# Patient Record
Sex: FEMALE | Race: WHITE | NOT HISPANIC OR LATINO | Employment: PART TIME | URBAN - METROPOLITAN AREA
[De-identification: names, ages, dates, MRNs, and addresses within clinical notes are randomized per-mention and may not be internally consistent; named-entity substitution may affect disease eponyms.]

---

## 2022-11-01 ENCOUNTER — HOSPITAL ENCOUNTER (OUTPATIENT)
Facility: HOSPITAL | Age: 42
Setting detail: SURGERY ADMIT
End: 2022-11-01
Attending: SURGERY | Admitting: SURGERY

## 2022-11-01 ENCOUNTER — HOSPITAL ENCOUNTER (INPATIENT)
Facility: HOSPITAL | Age: 42
LOS: 2 days | Discharge: HOME | End: 2022-11-03
Attending: SURGERY | Admitting: SURGERY

## 2022-11-01 ENCOUNTER — DOCUMENTATION (OUTPATIENT)
Dept: SURGERY | Facility: HOSPITAL | Age: 42
End: 2022-11-01

## 2022-11-01 DIAGNOSIS — B99.9 INFECTION: ICD-10-CM

## 2022-11-01 PROBLEM — T85.79XA: Status: ACTIVE | Noted: 2022-11-01

## 2022-11-01 LAB
ANION GAP SERPL CALCULATED.3IONS-SCNC: 12.8 MMOL/L (ref 5–15)
BASOPHILS # BLD AUTO: 0.05 10*3/MM3 (ref 0–0.2)
BASOPHILS NFR BLD AUTO: 0.5 % (ref 0–1.5)
BUN SERPL-MCNC: 20 MG/DL (ref 6–20)
BUN/CREAT SERPL: 25.3 (ref 7–25)
CALCIUM SPEC-SCNC: 9.5 MG/DL (ref 8.6–10.5)
CHLORIDE SERPL-SCNC: 101 MMOL/L (ref 98–107)
CO2 SERPL-SCNC: 27.2 MMOL/L (ref 22–29)
CREAT SERPL-MCNC: 0.79 MG/DL (ref 0.57–1)
DEPRECATED RDW RBC AUTO: 38.4 FL (ref 37–54)
EGFRCR SERPLBLD CKD-EPI 2021: 95.3 ML/MIN/1.73
EOSINOPHIL # BLD AUTO: 0.15 10*3/MM3 (ref 0–0.4)
EOSINOPHIL NFR BLD AUTO: 1.6 % (ref 0.3–6.2)
ERYTHROCYTE [DISTWIDTH] IN BLOOD BY AUTOMATED COUNT: 12.3 % (ref 12.3–15.4)
GLUCOSE SERPL-MCNC: 103 MG/DL (ref 65–99)
HCT VFR BLD AUTO: 39.4 % (ref 34–46.6)
HGB BLD-MCNC: 13.7 G/DL (ref 12–15.9)
IMM GRANULOCYTES # BLD AUTO: 0.04 10*3/MM3 (ref 0–0.05)
IMM GRANULOCYTES NFR BLD AUTO: 0.4 % (ref 0–0.5)
LYMPHOCYTES # BLD AUTO: 1.88 10*3/MM3 (ref 0.7–3.1)
LYMPHOCYTES NFR BLD AUTO: 19.5 % (ref 19.6–45.3)
MCH RBC QN AUTO: 29.6 PG (ref 26.6–33)
MCHC RBC AUTO-ENTMCNC: 34.8 G/DL (ref 31.5–35.7)
MCV RBC AUTO: 85.1 FL (ref 79–97)
MONOCYTES # BLD AUTO: 0.52 10*3/MM3 (ref 0.1–0.9)
MONOCYTES NFR BLD AUTO: 5.4 % (ref 5–12)
NEUTROPHILS NFR BLD AUTO: 7.01 10*3/MM3 (ref 1.7–7)
NEUTROPHILS NFR BLD AUTO: 72.6 % (ref 42.7–76)
NRBC BLD AUTO-RTO: 0 /100 WBC (ref 0–0.2)
PLATELET # BLD AUTO: 285 10*3/MM3 (ref 140–450)
PMV BLD AUTO: 10.6 FL (ref 6–12)
POTASSIUM SERPL-SCNC: 4.1 MMOL/L (ref 3.5–5.2)
RBC # BLD AUTO: 4.63 10*6/MM3 (ref 3.77–5.28)
SODIUM SERPL-SCNC: 141 MMOL/L (ref 136–145)
WBC NRBC COR # BLD: 9.65 10*3/MM3 (ref 3.4–10.8)

## 2022-11-01 PROCEDURE — 25010000002 AMPICILLIN-SULBACTAM PER 1.5 G: Performed by: SURGERY

## 2022-11-01 PROCEDURE — 0HPT0NZ REMOVAL OF TISSUE EXPANDER FROM RIGHT BREAST, OPEN APPROACH: ICD-10-PCS | Performed by: SURGERY

## 2022-11-01 PROCEDURE — 85025 COMPLETE CBC W/AUTO DIFF WBC: CPT | Performed by: SURGERY

## 2022-11-01 PROCEDURE — 80048 BASIC METABOLIC PNL TOTAL CA: CPT | Performed by: SURGERY

## 2022-11-01 RX ORDER — PROGESTERONE 100 MG/1
100 CAPSULE ORAL NIGHTLY
Status: DISCONTINUED | OUTPATIENT
Start: 2022-11-01 | End: 2022-11-03 | Stop reason: HOSPADM

## 2022-11-01 RX ORDER — ESCITALOPRAM OXALATE 20 MG/1
20 TABLET ORAL DAILY
COMMUNITY

## 2022-11-01 RX ORDER — PROGESTERONE 100 MG/1
100 CAPSULE ORAL DAILY
COMMUNITY

## 2022-11-01 RX ORDER — ACETAMINOPHEN 500 MG
1000 TABLET ORAL EVERY 8 HOURS PRN
Status: DISCONTINUED | OUTPATIENT
Start: 2022-11-01 | End: 2022-11-02

## 2022-11-01 RX ORDER — SPIRONOLACTONE 100 MG/1
100 TABLET, FILM COATED ORAL DAILY
Status: DISCONTINUED | OUTPATIENT
Start: 2022-11-01 | End: 2022-11-03 | Stop reason: HOSPADM

## 2022-11-01 RX ORDER — L.ACID,PARA/B.BIFIDUM/S.THERM 8B CELL
1 CAPSULE ORAL DAILY
Status: DISCONTINUED | OUTPATIENT
Start: 2022-11-01 | End: 2022-11-03 | Stop reason: HOSPADM

## 2022-11-01 RX ORDER — CETIRIZINE HYDROCHLORIDE 10 MG/1
10 TABLET ORAL NIGHTLY
Status: DISCONTINUED | OUTPATIENT
Start: 2022-11-01 | End: 2022-11-03 | Stop reason: HOSPADM

## 2022-11-01 RX ORDER — ESCITALOPRAM OXALATE 20 MG/1
20 TABLET ORAL NIGHTLY
Status: DISCONTINUED | OUTPATIENT
Start: 2022-11-01 | End: 2022-11-03 | Stop reason: HOSPADM

## 2022-11-01 RX ORDER — SPIRONOLACTONE 100 MG/1
100 TABLET, FILM COATED ORAL DAILY
COMMUNITY

## 2022-11-01 RX ADMIN — Medication 1 CAPSULE: at 21:06

## 2022-11-01 RX ADMIN — PROGESTERONE 100 MG: 100 CAPSULE ORAL at 21:06

## 2022-11-01 RX ADMIN — CETIRIZINE HYDROCHLORIDE 10 MG: 10 TABLET ORAL at 21:06

## 2022-11-01 RX ADMIN — ACETAMINOPHEN 1000 MG: 500 TABLET ORAL at 18:10

## 2022-11-01 RX ADMIN — AMPICILLIN SODIUM AND SULBACTAM SODIUM 3 G: 2; 1 INJECTION, POWDER, FOR SOLUTION INTRAMUSCULAR; INTRAVENOUS at 19:07

## 2022-11-01 RX ADMIN — ESCITALOPRAM 20 MG: 20 TABLET, FILM COATED ORAL at 21:06

## 2022-11-01 RX ADMIN — SPIRONOLACTONE 100 MG: 100 TABLET ORAL at 21:06

## 2022-11-01 NOTE — PLAN OF CARE
Goal Outcome Evaluation:  Plan of Care Reviewed With: patient        Progress: no change  Outcome Evaluation: Pt direct admit due to R breast tissue expander infection. Will have removal tomorrow AM. NPO at midnight. Waiting for IV team to place IV for IV antibiotic therapy.

## 2022-11-01 NOTE — H&P
[facility]   HISTORY AND PHYSICAL    Patient Name:Svetlana Colunga  : 1980  MRN: [id number]  Primary Care Physician: Peyton Castro MD  Date of admission: (Not on file)    Subjective   Subjective     Chief Complaint: infected right breast tissue expander    History of Present Illness   Svetlana Colunga is a 42 y.o. female s/p reinsertion of right breast TE after prior Serratia infection. Has had several days of redness swelling. Got better on IV antibiotics over the weekend. But worsened today including drainage.    Review of Systems   Constitutional: Positive for fatigue.   All other systems reviewed and are negative.        Personal History     No past medical history on file.    No past surgical history on file.    Family History: Her family history is not on file.     Social History: She  reports that she has never smoked. She does not have any smokeless tobacco history on file. No history on file for alcohol use and drug use.    Home Medications:  Multi For Her, Probiotic Daily, azithromycin, brompheniramine-pseudoephedrine-DM, cetirizine, and ketotifen    Allergies:  She is allergic to minocycline and tetracycline.    Objective    Objective     Vitals:    BP: ()/()   Arterial Line BP: ()/()     Physical Exam  Cardiovascular:      Rate and Rhythm: Normal rate.   Pulmonary:      Effort: Pulmonary effort is normal.   Abdominal:      Palpations: Abdomen is soft.   Skin:     Comments: RIght breast TE in place. Red. Drainage laterally.  Left breast implant in place. No SOI.   Neurological:      Mental Status: She is alert.          Result Review    [unfilled]    Assessment & Plan   Assessment / Plan     Brief Patient Summary:  Svetlana Colunga is a 42 y.o. female with infected right breast TE failing outpatient management.    Active Hospital Problems:  There are no active hospital problems to display for this patient.    Plan:   Removal right breast TE    DVT prophylaxis:  No DVT prophylaxis order  currently exists.    Peewee Krause MD

## 2022-11-02 ENCOUNTER — ANESTHESIA EVENT (OUTPATIENT)
Dept: PERIOP | Facility: HOSPITAL | Age: 42
End: 2022-11-02

## 2022-11-02 ENCOUNTER — ANESTHESIA (OUTPATIENT)
Dept: PERIOP | Facility: HOSPITAL | Age: 42
End: 2022-11-02

## 2022-11-02 PROCEDURE — 25010000002 FENTANYL CITRATE (PF) 100 MCG/2ML SOLUTION

## 2022-11-02 PROCEDURE — 25010000002 GENTAMICIN PER 80 MG: Performed by: SURGERY

## 2022-11-02 PROCEDURE — 87205 SMEAR GRAM STAIN: CPT | Performed by: SURGERY

## 2022-11-02 PROCEDURE — 87015 SPECIMEN INFECT AGNT CONCNTJ: CPT | Performed by: SURGERY

## 2022-11-02 PROCEDURE — 25010000002 AMPICILLIN-SULBACTAM PER 1.5 G: Performed by: SURGERY

## 2022-11-02 PROCEDURE — 0 LIDOCAINE 1 % SOLUTION: Performed by: SURGERY

## 2022-11-02 PROCEDURE — 25010000002 CEFAZOLIN PER 500 MG: Performed by: SURGERY

## 2022-11-02 PROCEDURE — 87070 CULTURE OTHR SPECIMN AEROBIC: CPT | Performed by: SURGERY

## 2022-11-02 PROCEDURE — 25010000002 PROPOFOL 10 MG/ML EMULSION

## 2022-11-02 PROCEDURE — 87075 CULTR BACTERIA EXCEPT BLOOD: CPT | Performed by: SURGERY

## 2022-11-02 PROCEDURE — 25010000002 ONDANSETRON PER 1 MG

## 2022-11-02 PROCEDURE — 25010000002 DEXAMETHASONE SODIUM PHOSPHATE 20 MG/5ML SOLUTION

## 2022-11-02 RX ORDER — SODIUM CHLORIDE 0.9 % (FLUSH) 0.9 %
3-10 SYRINGE (ML) INJECTION AS NEEDED
Status: DISCONTINUED | OUTPATIENT
Start: 2022-11-02 | End: 2022-11-02 | Stop reason: HOSPADM

## 2022-11-02 RX ORDER — LIDOCAINE HYDROCHLORIDE 10 MG/ML
0.5 INJECTION, SOLUTION EPIDURAL; INFILTRATION; INTRACAUDAL; PERINEURAL ONCE AS NEEDED
Status: DISCONTINUED | OUTPATIENT
Start: 2022-11-02 | End: 2022-11-02 | Stop reason: HOSPADM

## 2022-11-02 RX ORDER — PROMETHAZINE HYDROCHLORIDE 25 MG/1
25 SUPPOSITORY RECTAL ONCE AS NEEDED
Status: DISCONTINUED | OUTPATIENT
Start: 2022-11-02 | End: 2022-11-02 | Stop reason: HOSPADM

## 2022-11-02 RX ORDER — HYDROMORPHONE HYDROCHLORIDE 1 MG/ML
0.5 INJECTION, SOLUTION INTRAMUSCULAR; INTRAVENOUS; SUBCUTANEOUS
Status: DISCONTINUED | OUTPATIENT
Start: 2022-11-02 | End: 2022-11-02 | Stop reason: HOSPADM

## 2022-11-02 RX ORDER — MIDAZOLAM HYDROCHLORIDE 1 MG/ML
1 INJECTION INTRAMUSCULAR; INTRAVENOUS
Status: DISCONTINUED | OUTPATIENT
Start: 2022-11-02 | End: 2022-11-02 | Stop reason: HOSPADM

## 2022-11-02 RX ORDER — DIPHENHYDRAMINE HCL 25 MG
25 CAPSULE ORAL
Status: DISCONTINUED | OUTPATIENT
Start: 2022-11-02 | End: 2022-11-02 | Stop reason: HOSPADM

## 2022-11-02 RX ORDER — ACETAMINOPHEN 500 MG
1000 TABLET ORAL EVERY 8 HOURS PRN
Status: DISCONTINUED | OUTPATIENT
Start: 2022-11-02 | End: 2022-11-03 | Stop reason: HOSPADM

## 2022-11-02 RX ORDER — HYDROCODONE BITARTRATE AND ACETAMINOPHEN 7.5; 325 MG/1; MG/1
1 TABLET ORAL ONCE AS NEEDED
Status: DISCONTINUED | OUTPATIENT
Start: 2022-11-02 | End: 2022-11-02 | Stop reason: HOSPADM

## 2022-11-02 RX ORDER — FENTANYL CITRATE 50 UG/ML
INJECTION, SOLUTION INTRAMUSCULAR; INTRAVENOUS AS NEEDED
Status: DISCONTINUED | OUTPATIENT
Start: 2022-11-02 | End: 2022-11-02 | Stop reason: SURG

## 2022-11-02 RX ORDER — SCOLOPAMINE TRANSDERMAL SYSTEM 1 MG/1
1 PATCH, EXTENDED RELEASE TRANSDERMAL ONCE
Status: DISCONTINUED | OUTPATIENT
Start: 2022-11-02 | End: 2022-11-02 | Stop reason: HOSPADM

## 2022-11-02 RX ORDER — IBUPROFEN 600 MG/1
600 TABLET ORAL ONCE AS NEEDED
Status: DISCONTINUED | OUTPATIENT
Start: 2022-11-02 | End: 2022-11-02 | Stop reason: HOSPADM

## 2022-11-02 RX ORDER — SODIUM CHLORIDE, SODIUM LACTATE, POTASSIUM CHLORIDE, CALCIUM CHLORIDE 600; 310; 30; 20 MG/100ML; MG/100ML; MG/100ML; MG/100ML
9 INJECTION, SOLUTION INTRAVENOUS CONTINUOUS
Status: DISCONTINUED | OUTPATIENT
Start: 2022-11-02 | End: 2022-11-03 | Stop reason: HOSPADM

## 2022-11-02 RX ORDER — LIDOCAINE HYDROCHLORIDE 20 MG/ML
INJECTION, SOLUTION INFILTRATION; PERINEURAL AS NEEDED
Status: DISCONTINUED | OUTPATIENT
Start: 2022-11-02 | End: 2022-11-02 | Stop reason: SURG

## 2022-11-02 RX ORDER — PROPOFOL 10 MG/ML
VIAL (ML) INTRAVENOUS AS NEEDED
Status: DISCONTINUED | OUTPATIENT
Start: 2022-11-02 | End: 2022-11-02 | Stop reason: SURG

## 2022-11-02 RX ORDER — OXYCODONE AND ACETAMINOPHEN 7.5; 325 MG/1; MG/1
1 TABLET ORAL EVERY 4 HOURS PRN
Status: DISCONTINUED | OUTPATIENT
Start: 2022-11-02 | End: 2022-11-02 | Stop reason: HOSPADM

## 2022-11-02 RX ORDER — LIDOCAINE HYDROCHLORIDE 10 MG/ML
INJECTION, SOLUTION INFILTRATION; PERINEURAL AS NEEDED
Status: DISCONTINUED | OUTPATIENT
Start: 2022-11-02 | End: 2022-11-02 | Stop reason: HOSPADM

## 2022-11-02 RX ORDER — HYDROCODONE BITARTRATE AND ACETAMINOPHEN 7.5; 325 MG/1; MG/1
1 TABLET ORAL EVERY 6 HOURS PRN
Status: DISCONTINUED | OUTPATIENT
Start: 2022-11-02 | End: 2022-11-03 | Stop reason: HOSPADM

## 2022-11-02 RX ORDER — FENTANYL CITRATE 50 UG/ML
50 INJECTION, SOLUTION INTRAMUSCULAR; INTRAVENOUS
Status: DISCONTINUED | OUTPATIENT
Start: 2022-11-02 | End: 2022-11-02 | Stop reason: HOSPADM

## 2022-11-02 RX ORDER — HYDRALAZINE HYDROCHLORIDE 20 MG/ML
5 INJECTION INTRAMUSCULAR; INTRAVENOUS
Status: DISCONTINUED | OUTPATIENT
Start: 2022-11-02 | End: 2022-11-02 | Stop reason: HOSPADM

## 2022-11-02 RX ORDER — NALOXONE HCL 0.4 MG/ML
0.2 VIAL (ML) INJECTION AS NEEDED
Status: DISCONTINUED | OUTPATIENT
Start: 2022-11-02 | End: 2022-11-02 | Stop reason: HOSPADM

## 2022-11-02 RX ORDER — ONDANSETRON 2 MG/ML
4 INJECTION INTRAMUSCULAR; INTRAVENOUS ONCE AS NEEDED
Status: DISCONTINUED | OUTPATIENT
Start: 2022-11-02 | End: 2022-11-02 | Stop reason: HOSPADM

## 2022-11-02 RX ORDER — PROMETHAZINE HYDROCHLORIDE 25 MG/1
25 TABLET ORAL ONCE AS NEEDED
Status: DISCONTINUED | OUTPATIENT
Start: 2022-11-02 | End: 2022-11-02 | Stop reason: HOSPADM

## 2022-11-02 RX ORDER — LABETALOL HYDROCHLORIDE 5 MG/ML
5 INJECTION, SOLUTION INTRAVENOUS
Status: DISCONTINUED | OUTPATIENT
Start: 2022-11-02 | End: 2022-11-02 | Stop reason: HOSPADM

## 2022-11-02 RX ORDER — FLUMAZENIL 0.1 MG/ML
0.2 INJECTION INTRAVENOUS AS NEEDED
Status: DISCONTINUED | OUTPATIENT
Start: 2022-11-02 | End: 2022-11-02 | Stop reason: HOSPADM

## 2022-11-02 RX ORDER — FAMOTIDINE 10 MG/ML
20 INJECTION, SOLUTION INTRAVENOUS ONCE
Status: DISCONTINUED | OUTPATIENT
Start: 2022-11-02 | End: 2022-11-02 | Stop reason: HOSPADM

## 2022-11-02 RX ORDER — EPHEDRINE SULFATE 50 MG/ML
5 INJECTION, SOLUTION INTRAVENOUS ONCE AS NEEDED
Status: DISCONTINUED | OUTPATIENT
Start: 2022-11-02 | End: 2022-11-02 | Stop reason: HOSPADM

## 2022-11-02 RX ORDER — ONDANSETRON 2 MG/ML
INJECTION INTRAMUSCULAR; INTRAVENOUS AS NEEDED
Status: DISCONTINUED | OUTPATIENT
Start: 2022-11-02 | End: 2022-11-02 | Stop reason: SURG

## 2022-11-02 RX ORDER — DEXAMETHASONE SODIUM PHOSPHATE 4 MG/ML
INJECTION, SOLUTION INTRA-ARTICULAR; INTRALESIONAL; INTRAMUSCULAR; INTRAVENOUS; SOFT TISSUE AS NEEDED
Status: DISCONTINUED | OUTPATIENT
Start: 2022-11-02 | End: 2022-11-02 | Stop reason: SURG

## 2022-11-02 RX ORDER — DIPHENHYDRAMINE HYDROCHLORIDE 50 MG/ML
12.5 INJECTION INTRAMUSCULAR; INTRAVENOUS
Status: DISCONTINUED | OUTPATIENT
Start: 2022-11-02 | End: 2022-11-02 | Stop reason: HOSPADM

## 2022-11-02 RX ORDER — SODIUM CHLORIDE 0.9 % (FLUSH) 0.9 %
3 SYRINGE (ML) INJECTION EVERY 12 HOURS SCHEDULED
Status: DISCONTINUED | OUTPATIENT
Start: 2022-11-02 | End: 2022-11-02 | Stop reason: HOSPADM

## 2022-11-02 RX ADMIN — HYDROCODONE BITARTRATE AND ACETAMINOPHEN 1 TABLET: 7.5; 325 TABLET ORAL at 15:01

## 2022-11-02 RX ADMIN — FAMOTIDINE 20 MG: 10 INJECTION INTRAVENOUS at 07:07

## 2022-11-02 RX ADMIN — ACETAMINOPHEN 1000 MG: 500 TABLET ORAL at 11:53

## 2022-11-02 RX ADMIN — PROGESTERONE 100 MG: 100 CAPSULE ORAL at 20:01

## 2022-11-02 RX ADMIN — ACETAMINOPHEN 1000 MG: 500 TABLET ORAL at 20:01

## 2022-11-02 RX ADMIN — DEXAMETHASONE SODIUM PHOSPHATE 8 MG: 4 INJECTION, SOLUTION INTRAMUSCULAR; INTRAVENOUS at 07:51

## 2022-11-02 RX ADMIN — SODIUM CHLORIDE, POTASSIUM CHLORIDE, SODIUM LACTATE AND CALCIUM CHLORIDE 9 ML/HR: 600; 310; 30; 20 INJECTION, SOLUTION INTRAVENOUS at 07:08

## 2022-11-02 RX ADMIN — ESCITALOPRAM 20 MG: 20 TABLET, FILM COATED ORAL at 20:01

## 2022-11-02 RX ADMIN — AMPICILLIN SODIUM AND SULBACTAM SODIUM 3 G: 2; 1 INJECTION, POWDER, FOR SOLUTION INTRAMUSCULAR; INTRAVENOUS at 11:53

## 2022-11-02 RX ADMIN — HYDROCODONE BITARTRATE AND ACETAMINOPHEN 1 TABLET: 7.5; 325 TABLET ORAL at 21:23

## 2022-11-02 RX ADMIN — AMPICILLIN SODIUM AND SULBACTAM SODIUM 3 G: 2; 1 INJECTION, POWDER, FOR SOLUTION INTRAMUSCULAR; INTRAVENOUS at 18:39

## 2022-11-02 RX ADMIN — LIDOCAINE HYDROCHLORIDE 60 MG: 20 INJECTION, SOLUTION INFILTRATION; PERINEURAL at 07:45

## 2022-11-02 RX ADMIN — PROPOFOL 200 MG: 10 INJECTION, EMULSION INTRAVENOUS at 07:46

## 2022-11-02 RX ADMIN — AMPICILLIN SODIUM AND SULBACTAM SODIUM 3 G: 2; 1 INJECTION, POWDER, FOR SOLUTION INTRAMUSCULAR; INTRAVENOUS at 05:46

## 2022-11-02 RX ADMIN — CETIRIZINE HYDROCHLORIDE 10 MG: 10 TABLET ORAL at 20:01

## 2022-11-02 RX ADMIN — SCOPALAMINE 1 PATCH: 1 PATCH, EXTENDED RELEASE TRANSDERMAL at 07:07

## 2022-11-02 RX ADMIN — FENTANYL CITRATE 50 MCG: 50 INJECTION, SOLUTION INTRAMUSCULAR; INTRAVENOUS at 07:51

## 2022-11-02 RX ADMIN — AMPICILLIN SODIUM AND SULBACTAM SODIUM 3 G: 2; 1 INJECTION, POWDER, FOR SOLUTION INTRAMUSCULAR; INTRAVENOUS at 00:38

## 2022-11-02 RX ADMIN — ONDANSETRON 4 MG: 2 INJECTION INTRAMUSCULAR; INTRAVENOUS at 08:20

## 2022-11-02 NOTE — PLAN OF CARE
Goal Outcome Evaluation:  Plan of Care Reviewed With: patient        Progress: no change  Outcome Evaluation: From PACU this AM. Dressing CDI. DESTINY x1 with small amount bloody output. Up ad michelle. Tolerating regular diet. Tylenol and norco given for pain. Voiding freely. Abx continued. Will continue to monitor.

## 2022-11-02 NOTE — ANESTHESIA PROCEDURE NOTES
Airway  Urgency: elective    Date/Time: 11/2/2022 7:47 AM    General Information and Staff    Patient location during procedure: OR  CRNA/CAA: Wes Rolon CRNA    Indications and Patient Condition  Indications for airway management: airway protection    Preoxygenated: yes  Mask difficulty assessment: 0 - not attempted    Final Airway Details  Final airway type: supraglottic airway      Successful airway: LMA  Size 4     Number of attempts at approach: 1  Assessment: lips, teeth, and gum same as pre-op

## 2022-11-02 NOTE — ANESTHESIA POSTPROCEDURE EVALUATION
"Patient: Svetlana Colunga    Procedure Summary     Date: 11/02/22 Room / Location: [facility] OR [address] [place] [address] MAIN OR    Anesthesia Start: 0739 Anesthesia Stop: 0842    Procedure: REMOVAL RIGHT BREAST  EXPANDER REMOVAL (Right: Breast) Diagnosis:     Surgeons: Peewee Krause MD Provider: Maureen Winn MD    Anesthesia Type: general ASA Status: 2          Anesthesia Type: general    Vitals  Vitals Value Taken Time   /70 11/02/22 0856   Temp 36.6 °C (97.8 °F) 11/02/22 0840   Pulse 60 11/02/22 0910   Resp 16 11/02/22 0855   SpO2 100 % 11/02/22 0910   Vitals shown include unvalidated device data.        Post Anesthesia Care and Evaluation    Patient location during evaluation: PACU  Patient participation: complete - patient participated  Level of consciousness: awake  Pain management: adequate    Airway patency: patent  Anesthetic complications: No anesthetic complications    Cardiovascular status: acceptable  Respiratory status: acceptable  Hydration status: acceptable    Comments: /70   Pulse 65   Temp 36.6 °C (97.8 °F) (Oral)   Resp 16   Ht 170.2 cm (67\")   Wt 93 kg (205 lb)   LMP 10/25/2022 (Approximate)   SpO2 100%   BMI 32.11 kg/m²       "

## 2022-11-02 NOTE — ADDENDUM NOTE
Addendum  created 11/02/22 0958 by Maureen Winn MD    Attestation recorded in Intraprocedure, Intraprocedure Attestations filed

## 2022-11-02 NOTE — PAYOR COMM NOTE
"Svetlana Colunga (42 y.o. Female)         U/D FOR [id number]    CONTACT DAQUAN VERGARA  P# [phone]  F# [phone]        Date of Birth   1980    Social Security Number       Address   [address] [place], [place] [address]    Home Phone   [phone]    MRN   [id number]       Mandaeism   Denominational    Marital Status                               Admission Date   11/1/22    Admission Type   Urgent    Admitting Provider   Peewee Krause MD    Attending Provider   Peewee Krause MD    Department, Room/Bed   [address], 82/1       Discharge Date       Discharge Disposition       Discharge Destination                               Attending Provider: Peewee Krause MD    Allergies: Minocycline, Tetracycline    Isolation: None   Infection: None   Code Status: Not on file    Ht: 170.2 cm (67\")   Wt: 93 kg (205 lb)    Admission Cmt: None   Principal Problem: Infection of breast implant, initial encounter ([facility]) [T85.79XA]                 Active Insurance as of 11/1/2022     Primary Coverage     Payor Plan Insurance Group Employer/Plan Group    ANTHEM BLUE CROSS ANTHEM BLUE CROSS BLUE SHIELD PPO [id number]     Payor Plan Address Payor Plan Phone Number Payor Plan Fax Number Effective Dates    [address] [phone]  1/1/2016 - None Entered    [place] [address]       Subscriber Name Subscriber Birth Date Member ID       SHON COLUNGA S 1980 [id number]                 Emergency Contacts      (Rel.) Home Phone Work Phone Mobile Phone    Shon Colunga (Spouse) [phone] -- [phone]            History & Physical    No notes of this type exist for this encounter.            Operative/Procedure Notes (last 48 hours)      Peewee Krause MD at 11/02/22 0756        DOS 11/2/22  Preop dx: Infected right tissue xpander  Postop dx: same    Procedure: Removal of right breast tissue expander with partial capsulectomy  Surgeon: Peewee Krause  Anes: " General  EBL: Min  Drain: 15 f Ben x 1  Specimen: Fluid for culture.    History: 41 y/o with replacement of right breast TE a few weeks ago. Recent onset of s/sx of infection. Failed conservative therapy. For explantation.    Description: Brought to suite. Anesthesia induced. Prep and drape and timeout. IMF incision opened with scalpel then bovie. Return of murky green fluid that was cultured. The expander was deflated and removed.  About half of the alloderm was unincorporated and was sharply and bluntly excised. The remained of the pocket was curreted out. Thorough irrigation with triple antibiotic solution was performed. The drain was placed and secured. Closure was with 3-0 monocryl dermis and 4-0 monocryl subcuticular. Dressings applied. Counts correct. She was awakened extubated and taken to recovery.      Electronically signed by Peewee Krause MD at 11/02/22 0860         Physician Progress Notes (last 48 hours)  Notes from 10/31/22 1429 through 11/02/22 1429   No notes of this type exist for this encounter.       Consult Notes (last 48 hours)  Notes from 10/31/22 1429 through 11/02/22 1429   No notes of this type exist for this encounter.        All medication doses during the admission are shown, including meds that are no longer on order.  Scheduled Meds Sorted by Name  for Svetlana Colunga JACOB as of 10/31/22 through 11/2/22    1 Day 3 Days 7 Days 10 Days < Today >   Legend:                          Inactive     Active     Other Encounter     Linked                 Medications 10/31/22 11/01/22 11/02/22   ampicillin-sulbactam (UNASYN) 3 g in sodium chloride 0.9 % 100 mL IVPB-VTB  Dose: 3 g  Freq: Every 6 Hours Route: IV  Indications of Use: SKIN AND SOFT TISSUE INFECTION  Start: 11/02/22 1145 End: 11/07/22 1144   Admin Instructions:   Activate vial before using.      [id number]        ampicillin-sulbactam (UNASYN) 3 g in sodium chloride 0.9 % 100 mL IVPB-VTB  Dose: 3 g  Freq: Every 6 Hours Route:  IV  Indications of Use: SKIN AND SOFT TISSUE INFECTION  Start: 11/01/22 1900 End: 11/02/22 0944   Admin Instructions:   Activate vial before using.     1907        0038   0546        0944-D/C'd        cetirizine (zyrTEC) tablet 10 mg  Dose: 10 mg  Freq: Nightly Route: PO  Start: 11/01/22 2100 2106 0623 1114 2100        escitalopram (LEXAPRO) tablet 20 mg  Dose: 20 mg  Freq: Nightly Route: PO  Start: 11/01/22 2100   Admin Instructions:   Caution: Look alike/sound alike drug alert.     2106 0623 1114 2100        famotidine (PEPCID) injection 20 mg  Dose: 20 mg  Freq: Once Route: IV  Start: 11/02/22 0645 End: 11/02/22 0707   Admin Instructions:   Give IV push over 2 minutes.      0707          lactobacillus acidophilus (RISAQUAD) capsule 1 capsule  Dose: 1 capsule  Freq: Daily Route: PO  Start: 11/01/22 2015 2106 0623 0900 1114        Progesterone (PROMETRIUM) capsule 100 mg  Dose: 100 mg  Freq: Nightly Route: PO  Start: 11/01/22 2100   Admin Instructions:   Swallow whole; do not crush, split, or chew.     2106 0623 1114 2100        scopolamine patch 1 mg/72 hr  Dose: 1 patch  Freq: Once Route: TD  Start: 11/02/22 0645 End: 11/02/22 0707   Admin Instructions:   Do not apply if patient older than 65 or has history of glaucoma. Evening prior to 2 hours prior        0707   0707-D/C'd       sodium chloride 0.9 % flush 3 mL  Dose: 3 mL  Freq: Every 12 Hours Scheduled Route: IV  Start: 11/02/22 0900 End: 11/02/22 0707      0707-D/C'd        spironolactone (ALDACTONE) tablet 100 mg  Dose: 100 mg  Freq: Daily Route: PO  Start: 11/01/22 2015 2106 0623 0900   1114        Medications 10/31/22 11/01/22 11/02/22         Continuous Meds Sorted by Name  for Svetlana Colunga as of 10/31/22 through 11/2/22  Legend:                          Inactive     Active     Other Encounter     Linked                 Medications 10/31/22 11/01/22 11/02/22   lactated ringers  infusion  Rate: 9 mL/hr Dose: 9 mL/hr  Freq: Continuous Route: IV  Last Dose: 9 mL/hr (11/02/22 0708)  Start: 11/02/22 0645      0708   0738 [C]   0755     0850                PRN Meds Sorted by Name  for Svetlana Colunga as of 10/31/22 through 11/2/22  Legend:                          Inactive     Active     Other Encounter     Linked                 Medications 10/31/22 11/01/22 11/02/22   acetaminophen (TYLENOL) tablet 1,000 mg  Dose: 1,000 mg  Freq: Every 8 Hours PRN Route: PO  PRN Reason: Mild Pain  Start: 11/02/22 1111   Admin Instructions:   Based on patient request - if ordered for moderate or severe pain, provider allows for administration of a medication prescribed for a lower pain scale.  Do not exceed 4 grams of acetaminophen in a 24 hr period. Max dose of 2gm for AST/ALT greater than 120 units/L    If given for fever, use fever parameter: fever greater than 100.4 °F.    If given for pain, use the following pain scale:   Mild Pain = Pain Score of 1-3, CPOT 1-2  Moderate Pain = Pain Score of 4-6, CPOT 3-4  Severe Pain = Pain Score of 7-10, CPOT 5-8      1153          acetaminophen (TYLENOL) tablet 1,000 mg  Dose: 1,000 mg  Freq: Every 8 Hours PRN Route: PO  PRN Reasons: Mild Pain,Headache  Start: 11/01/22 1743 End: 11/02/22 1111   Admin Instructions:   Based on patient request - if ordered for moderate or severe pain, provider allows for administration of a medication prescribed for a lower pain scale.  Do not exceed 4 grams of acetaminophen in a 24 hr period. Max dose of 2gm for AST/ALT greater than 120 units/L    If given for fever, use fever parameter: fever greater than 100.4 °F.    If given for pain, use the following pain scale:   Mild Pain = Pain Score of 1-3, CPOT 1-2  Moderate Pain = Pain Score of 4-6, CPOT 3-4  Severe Pain = Pain Score of 7-10, CPOT 5-8     1810        0623   1111   1111-D/C'd      dexamethasone sodium phosphate injection  Freq: As Needed Route: IV  Start: 11/02/22 0751 End:  11/02/22 0844      0751   0844-D/C'd       diphenhydrAMINE (BENADRYL) capsule 25 mg  Dose: 25 mg  Freq: Every 30 Minutes PRN Route: PO  PRN Reason: Itching  PRN Comment: May repeat x 1  Indications of Use: EXTRAPYRAMIDAL DISORDER,PRURITUS  Start: 11/02/22 0840 End: 11/02/22 0940   Admin Instructions:   Caution: Look alike/sound alike drug alert. This med may be ordered in other forms and routes. Before giving verify the last time the drug was given by any route/form.      0940-D/C'd        diphenhydrAMINE (BENADRYL) injection 12.5 mg  Dose: 12.5 mg  Freq: Every 15 Minutes PRN Route: IV  PRN Reason: Itching  PRN Comment: May repeat x 1  Start: 11/02/22 0840 End: 11/02/22 0940   Admin Instructions:   Caution: Look alike/sound alike drug alert. This med may be ordered in other forms and routes. Before giving verify the last time the drug was given by any route/form.      0940-D/C'd        ePHEDrine injection 5 mg  Dose: 5 mg  Freq: Once As Needed Route: IV  PRN Comment: symptomatic hypotension - Notify attending anesthesiologist if this needs to be given  Start: 11/02/22 0840 End: 11/02/22 0940   Admin Instructions:   Caution: Look alike/sound alike drug alert   Dilute with NS to 5-10 mg/mL.  Central line preferred, if unavailable use large bore IV access with frequent nurse monitoring of IV site.      0940-D/C'd        fentaNYL citrate (PF) (SUBLIMAZE) injection  Freq: As Needed Route: IV  Start: 11/02/22 0751 End: 11/02/22 0844      0751 0844-D/C'd       fentaNYL citrate (PF) (SUBLIMAZE) injection 50 mcg  Dose: 50 mcg  Freq: Every 5 Minutes PRN Route: IV  PRN Reasons: Moderate Pain,Severe Pain  Start: 11/02/22 0840 End: 11/02/22 0940   Admin Instructions:   May alternate fentanyl with hydromorphone using fentanyl first.    Maximum total dose of fentanyl is 200 mcg.  If given for pain, use the following pain scale:  Mild Pain = Pain Score of 1-3, CPOT 1-2  Moderate Pain = Pain Score of 4-6, CPOT 3-4  Severe Pain =  Pain Score of 7-10, CPOT 5-8      0940-D/C'd        fentaNYL citrate (PF) (SUBLIMAZE) injection 50 mcg  Dose: 50 mcg  Freq: Every 10 Minutes PRN Route: IV  PRN Reason: Severe Pain  Start: 11/02/22 0643 End: 11/02/22 0707   Admin Instructions:   Maximum total dose of fentanyl is 100 mcg.  If given for pain, use the following pain scale:  Mild Pain = Pain Score of 1-3, CPOT 1-2  Moderate Pain = Pain Score of 4-6, CPOT 3-4  Severe Pain = Pain Score of 7-10, CPOT 5-8      0707-D/C'd        flumazenil (ROMAZICON) injection 0.2 mg  Dose: 0.2 mg  Freq: As Needed Route: IV  PRN Comment: for benzodiazepine induced unresponsiveness or sedation  Indications of Use: BENZODIAZEPINE-INDUCED SEDATION  Start: 11/02/22 0840 End: 11/02/22 0940   Admin Instructions:   Notify Anesthesia if given  ** give IV over 15-30 seconds **      0940-D/C'd        hydrALAZINE (APRESOLINE) injection 5 mg  Dose: 5 mg  Freq: Every 10 Minutes PRN Route: IV  PRN Reason: High Blood Pressure  PRN Comment: for systolic blood pressure greater than 180 mmHg or diastolic blood pressure greater than 105 mmHg  Start: 11/02/22 0840 End: 11/02/22 0940   Admin Instructions:   Up to 20 mg.  Caution: Look alike/sound alike drug alert      0940-D/C'd        HYDROcodone-acetaminophen (NORCO) 7.5-325 MG per tablet 1 tablet  Dose: 1 tablet  Freq: Once As Needed Route: PO  PRN Reason: Moderate Pain  Start: 11/02/22 0840 End: 11/02/22 0940   Admin Instructions:   [CHERI]    Do not exceed 4 grams of acetaminophen in a 24 hr period. Max dose of 2gm for AST/ALT greater than 120 units/L        If given for pain, use the following pain scale:   Mild Pain = Pain Score of 1-3, CPOT 1-2  Moderate Pain = Pain Score of 4-6, CPOT 3-4  Severe Pain = Pain Score of 7-10, CPOT 5-8      0940-D/C'd        HYDROmorphone (DILAUDID) injection 0.5 mg  Dose: 0.5 mg  Freq: Every 5 Minutes PRN Route: IV  PRN Reasons: Moderate Pain,Severe Pain  Start: 11/02/22 0840 End: 11/02/22 0940   Admin  Instructions:   May alternate fentanyl with hydromorphone using fentanyl first.    Maximum total dose of hydromorphone is 2 mg.  If given for pain, use the following pain scale:  Mild Pain = Pain Score of 1-3, CPOT 1-2  Moderate Pain = Pain Score of 4-6, CPOT 3-4  Severe Pain = Pain Score of 7-10, CPOT 5-8      0940-D/C'd        ibuprofen (ADVIL,MOTRIN) tablet 600 mg  Dose: 600 mg  Freq: Once As Needed Route: PO  PRN Reason: Mild Pain  Start: 11/02/22 0840 End: 11/02/22 0940   Admin Instructions:   If given for pain, use the following pain scale:  Mild Pain = Pain Score of 1-3, CPOT 1-2  Moderate Pain = Pain Score of 4-6, CPOT 3-4  Severe Pain = Pain Score of 7-10, CPOT 5-8      0940-D/C'd        labetalol (NORMODYNE,TRANDATE) injection 5 mg  Dose: 5 mg  Freq: Every 5 Minutes PRN Route: IV  PRN Reason: High Blood Pressure  PRN Comment: for systolic blood pressure greater than 180 mmHg or diastolic blood pressure greater than 105 mmHg  Start: 11/02/22 0840 End: 11/02/22 0940   Admin Instructions:   Hold for heart rate less than 60.  Give IV Push over 2 minutes.      0940-D/C'd        lidocaine (XYLOCAINE) 1 % injection  Freq: As Needed  Start: 11/02/22 0804 End: 11/02/22 0838      0804   0838-D/C'd       lidocaine (XYLOCAINE) 2% injection  Freq: As Needed Route: INFILTRATION  Start: 11/02/22 0745 End: 11/02/22 0844      0745   0844-D/C'd       lidocaine PF 1% (XYLOCAINE) injection 0.5 mL  Dose: 0.5 mL  Freq: Once As Needed Route: IJ  PRN Comment: IV Start  Start: 11/02/22 0643 End: 11/02/22 0707      0707-D/C'd        midazolam (VERSED) injection 1 mg  Dose: 1 mg  Freq: Every 10 Minutes PRN Route: IV  PRN Comment: Anxiety prophylaxis, Pre-op comfort  Start: 11/02/22 0643 End: 11/02/22 0707   Admin Instructions:   May repeat dose in 10 minutes one time then contact provider for additional orders.        0707-D/C'd        naloxone (NARCAN) injection 0.2 mg  Dose: 0.2 mg  Freq: As Needed Route: IV  PRN Reasons: Opioid  Reversal,Respiratory Depression  PRN Comment: unresponsiveness, decrease oxygen saturation  Indications of Use: ACUTE RESPIRATORY FAILURE,OPIOID-INDUCED RESPIRATORY DEPRESSION  Start: 11/02/22 0840 End: 11/02/22 0940   Admin Instructions:   Notify Anesthesia if given      0940-D/C'd        ondansetron (ZOFRAN) injection  Freq: As Needed Route: IV  Start: 11/02/22 0820 End: 11/02/22 0844 0820 0844-D/C'd       ondansetron (ZOFRAN) injection 4 mg  Dose: 4 mg  Freq: Once As Needed Route: IV  PRN Reasons: Nausea,Vomiting  Indications of Use: POSTOPERATIVE NAUSEA AND VOMITING  Start: 11/02/22 0840 End: 11/02/22 0940   Admin Instructions:   If BOTH ondansetron (ZOFRAN) and promethazine (PHENERGAN) are ordered use ondansetron first and THEN promethazine IF ondansetron is ineffective.      0940-D/C'd        oxyCODONE-acetaminophen (PERCOCET) 7.5-325 MG per tablet 1 tablet  Dose: 1 tablet  Freq: Every 4 Hours PRN Route: PO  PRN Reason: Severe Pain  Start: 11/02/22 0840 End: 11/02/22 0940   Admin Instructions:   [CHERI]    Do not exceed 4 grams of acetaminophen in a 24 hr period. Max dose of 2gm for AST/ALT greater than 120 units/L        If given for pain, use the following pain scale:   Mild Pain = Pain Score of 1-3, CPOT 1-2  Moderate Pain = Pain Score of 4-6, CPOT 3-4  Severe Pain = Pain Score of 7-10, CPOT 5-8      0940-D/C'd         promethazine (PHENERGAN) suppository 25 mg  Dose: 25 mg  Freq: Once As Needed Route: RE  PRN Reasons: Nausea,Vomiting  Start: 11/02/22 0840 End: 11/02/22 0940   Admin Instructions:   If BOTH ondansetron (ZOFRAN) and promethazine (PHENERGAN) are ordered use ondansetron first and THEN promethazine IF ondansetron is ineffective.      0940-D/C'd        Or  promethazine (PHENERGAN) tablet 25 mg  Dose: 25 mg  Freq: Once As Needed Route: PO  PRN Reasons: Nausea,Vomiting  Start: 11/02/22 0840 End: 11/02/22 0940   Admin Instructions:   If BOTH ondansetron (ZOFRAN) and promethazine (PHENERGAN) are  ordered use ondansetron first and THEN promethazine IF ondansetron is ineffective.      0940-D/C'd        Propofol (DIPRIVAN) injection  Freq: As Needed Route: IV  Start: 11/02/22 0746 End: 11/02/22 0844      0746   0844-D/C'd       sodium chloride 0.9 % flush 3-10 mL  Dose: 3-10 mL  Freq: As Needed Route: IV  PRN Reason: Line Care  Start: 11/02/22 0643 End: 11/02/22 0707      0707-D/C'd        sodium chloride 1,000 mL with ceFAZolin 1 g, gentamicin 80 mg, neomycin-polymyxin b 1 mL irrigation  Freq: As Needed  Start: 11/02/22 0805 End: 11/02/22 0838      0805   0838-D/C'd       Medications 10/31/22 11/01/22 11/02/22

## 2022-11-02 NOTE — PLAN OF CARE
Goal Outcome Evaluation:  Plan of Care Reviewed With: patient        Progress: no change  Outcome Evaluation: vss, rt breast pain manageable by tylenol, up ad michelle, rt breast firm, redness and warm to touch, with small open wound draining scan amount of yellow drainage, dressed with border dressing, skin prep done, continue to monitor the pt.

## 2022-11-02 NOTE — OP NOTE
DOS 11/2/22  Preop dx: Infected right tissue xpander  Postop dx: same    Procedure: Removal of right breast tissue expander with partial capsulectomy  Surgeon: Peewee Krause  Anes: General  EBL: Min  Drain: 15 f Ben x 1  Specimen: Fluid for culture.    History: 41 y/o with replacement of right breast TE a few weeks ago. Recent onset of s/sx of infection. Failed conservative therapy. For explantation.    Description: Brought to suite. Anesthesia induced. Prep and drape and timeout. IMF incision opened with scalpel then bovie. Return of murky green fluid that was cultured. The expander was deflated and removed.  About half of the alloderm was unincorporated and was sharply and bluntly excised. The remained of the pocket was curreted out. Thorough irrigation with triple antibiotic solution was performed. The drain was placed and secured. Closure was with 3-0 monocryl dermis and 4-0 monocryl subcuticular. Dressings applied. Counts correct. She was awakened extubated and taken to recovery.

## 2022-11-02 NOTE — ANESTHESIA PREPROCEDURE EVALUATION
Anesthesia Evaluation     Patient summary reviewed and Nursing notes reviewed   NPO Solid Status: > 8 hours  NPO Liquid Status: > 2 hours           Airway   Mallampati: II  Dental - normal exam     Pulmonary - normal exam   (+) a smoker Former,   Cardiovascular - negative cardio ROS and normal exam    (-) hypertension      Neuro/Psych  (+) psychiatric history Anxiety,    GI/Hepatic/Renal/Endo    (+) obesity,     (-) no renal disease, diabetes    Musculoskeletal (-) negative ROS    Abdominal    Substance History - negative use     OB/GYN          Other - negative ROS                     Anesthesia Plan    ASA 2     general     (I have reviewed all pertinent information including medical history,allergies, imaging, studies and laboratory results. I have explained risks and benefits to anesthesia, including but not limited to; dental damage, corneal abrasion, sore throat, nausea, vomiting, aspiration, nerve damage, failed block, MI,stroke and death. Patient has agreed to proceed. )    Anesthetic plan, risks, benefits, and alternatives have been provided, discussed and informed consent has been obtained with: patient.        CODE STATUS:

## 2022-11-03 VITALS
OXYGEN SATURATION: 97 % | SYSTOLIC BLOOD PRESSURE: 106 MMHG | TEMPERATURE: 97.7 F | HEIGHT: 67 IN | HEART RATE: 66 BPM | DIASTOLIC BLOOD PRESSURE: 72 MMHG | BODY MASS INDEX: 32.18 KG/M2 | WEIGHT: 205 LBS | RESPIRATION RATE: 16 BRPM

## 2022-11-03 LAB
DEPRECATED RDW RBC AUTO: 38.8 FL (ref 37–54)
ERYTHROCYTE [DISTWIDTH] IN BLOOD BY AUTOMATED COUNT: 12.1 % (ref 12.3–15.4)
HCT VFR BLD AUTO: 34.5 % (ref 34–46.6)
HGB BLD-MCNC: 11.4 G/DL (ref 12–15.9)
MCH RBC QN AUTO: 28.6 PG (ref 26.6–33)
MCHC RBC AUTO-ENTMCNC: 33 G/DL (ref 31.5–35.7)
MCV RBC AUTO: 86.7 FL (ref 79–97)
PLATELET # BLD AUTO: 233 10*3/MM3 (ref 140–450)
PMV BLD AUTO: 10.2 FL (ref 6–12)
RBC # BLD AUTO: 3.98 10*6/MM3 (ref 3.77–5.28)
WBC NRBC COR # BLD: 9.41 10*3/MM3 (ref 3.4–10.8)

## 2022-11-03 PROCEDURE — 25010000002 AMPICILLIN-SULBACTAM PER 1.5 G: Performed by: SURGERY

## 2022-11-03 PROCEDURE — 85027 COMPLETE CBC AUTOMATED: CPT | Performed by: SURGERY

## 2022-11-03 RX ADMIN — AMPICILLIN SODIUM AND SULBACTAM SODIUM 3 G: 2; 1 INJECTION, POWDER, FOR SOLUTION INTRAMUSCULAR; INTRAVENOUS at 05:49

## 2022-11-03 RX ADMIN — ACETAMINOPHEN 1000 MG: 500 TABLET ORAL at 03:54

## 2022-11-03 RX ADMIN — AMPICILLIN SODIUM AND SULBACTAM SODIUM 3 G: 2; 1 INJECTION, POWDER, FOR SOLUTION INTRAMUSCULAR; INTRAVENOUS at 00:20

## 2022-11-03 RX ADMIN — Medication 1 CAPSULE: at 09:15

## 2022-11-03 RX ADMIN — SPIRONOLACTONE 100 MG: 100 TABLET ORAL at 09:15

## 2022-11-03 RX ADMIN — HYDROCODONE BITARTRATE AND ACETAMINOPHEN 1 TABLET: 7.5; 325 TABLET ORAL at 12:44

## 2022-11-03 NOTE — PROGRESS NOTES
AFVSS.  WBC normal.  DESTINY serous.  Awaiting cultures.    OK for D/C.  Will check cultures and contact tomorrow.  Has antibiotic at home.

## 2022-11-03 NOTE — PAYOR COMM NOTE
"Svetlana Colunga (42 y.o. Female)     DC SUMMARY FOR [id number]        Date of Birth   1980    Social Security Number       Address   [address] [place] [id number]    Home Phone   [phone]    MRN   [id number]       Synagogue   Ad[employer]ism    Marital Status                               Admission Date   11/1/22    Admission Type   Urgent    Admitting Provider   Peewee Krause MD    Attending Provider       Department, Room/Bed   [address], 82/1       Discharge Date   11/3/2022    Discharge Disposition   Home or Self Care    Discharge Destination                               Attending Provider: (none)   Allergies: Minocycline, Tetracycline    Isolation: None   Infection: None   Code Status: CPR    Ht: 170.2 cm (67\")   Wt: 93 kg (205 lb)    Admission Cmt: None   Principal Problem: Infection of breast implant, initial encounter ([facility]) [T85.79XA]                 Active Insurance as of 11/1/2022     Primary Coverage     Payor Plan Insurance Group Employer/Plan Group    [employer]HEM BLUE CROSS ANTHEM BLUE CROSS BLUE SHIELD PPO [id number]     Payor Plan Address Payor Plan Phone Number Payor Plan Fax Number Effective Dates    [address] [phone]  1/1/2016 - None Entered    [place] [address]       Subscriber Name Subscriber Birth Date Member ID       SHON COLUNGA S 1980 [id number]                 Emergency Contacts      (Rel.) Home Phone Work Phone Mobile Phone    Shon Colunga (Spouse) [phone] -- [phone]            Discharge Summary    No notes of this type exist for this encounter.     Peewee Krause MD   Physician  Surgery  Progress Notes     Signed  Date of Service:  11/03/22 0747  Creation Time:  11/03/22 0747     Signed        AFVSS.  WBC normal.  DESTINY serous.  Awaiting cultures.     OK for D/C.  Will check cultures and contact tomorrow.  Has antibiotic at home.                  "

## 2022-11-03 NOTE — PLAN OF CARE
Goal Outcome Evaluation:  Plan of Care Reviewed With: patient        Progress: no change  Outcome Evaluation: VSS, up ad michelle, voiding freely, dressing is CDI, DESTINY X 1 w/ small amount of sersanguineous output, IV antibiotic given as ordered, Tylenol and norco given for c/o pain w/ relief, tolerating reg diet

## 2022-11-04 NOTE — CASE MANAGEMENT/SOCIAL WORK
Case Management Discharge Note      Final Note: Home         Selected Continued Care - Discharged on 11/3/2022 Admission date: 11/1/2022 - Discharge disposition: Home or Self Care    Destination    No services have been selected for the patient.              Durable Medical Equipment    No services have been selected for the patient.              Dialysis/Infusion    No services have been selected for the patient.              Home Medical Care    No services have been selected for the patient.              Therapy    No services have been selected for the patient.              Community Resources    No services have been selected for the patient.              Community & DME    No services have been selected for the patient.                  Transportation Services  Private: Car    Final Discharge Disposition Code: 01 - home or self-care

## 2022-11-05 LAB
BACTERIA SPEC AEROBE CULT: NORMAL
GRAM STN SPEC: NORMAL
GRAM STN SPEC: NORMAL

## 2022-11-07 LAB — BACTERIA SPEC ANAEROBE CULT: NORMAL

## 2023-01-20 ENCOUNTER — OFFICE VISIT (OUTPATIENT)
Dept: NEUROLOGY | Facility: CLINIC | Age: 43
End: 2023-01-20
Payer: COMMERCIAL

## 2023-01-20 VITALS
SYSTOLIC BLOOD PRESSURE: 124 MMHG | DIASTOLIC BLOOD PRESSURE: 76 MMHG | WEIGHT: 223 LBS | OXYGEN SATURATION: 97 % | BODY MASS INDEX: 35 KG/M2 | HEART RATE: 64 BPM | HEIGHT: 67 IN

## 2023-01-20 DIAGNOSIS — M54.81 OCCIPITAL NEURALGIA OF RIGHT SIDE: ICD-10-CM

## 2023-01-20 DIAGNOSIS — G43.009 MIGRAINE WITHOUT AURA AND WITHOUT STATUS MIGRAINOSUS, NOT INTRACTABLE: Primary | ICD-10-CM

## 2023-01-20 PROCEDURE — 99204 OFFICE O/P NEW MOD 45 MIN: CPT | Performed by: PSYCHIATRY & NEUROLOGY

## 2023-01-20 RX ORDER — NARATRIPTAN 1 MG/1
1 TABLET ORAL ONCE AS NEEDED
Qty: 12 TABLET | Refills: 2 | Status: SHIPPED | OUTPATIENT
Start: 2023-01-20

## 2023-01-20 RX ORDER — ESTRADIOL 0.04 MG/D
FILM, EXTENDED RELEASE TRANSDERMAL
COMMUNITY
Start: 2023-01-11

## 2023-01-20 RX ORDER — TOPIRAMATE 25 MG/1
25 TABLET ORAL 2 TIMES DAILY
Qty: 60 TABLET | Refills: 2 | Status: SHIPPED | OUTPATIENT
Start: 2023-01-20 | End: 2023-03-31 | Stop reason: SDUPTHER

## 2023-01-20 NOTE — ASSESSMENT & PLAN NOTE
She does report headaches starting in the back of her head in the greater and lesser occipital nerve distribution on the right side mainly.  We can consider lesser and greater ONBs on the right side in the future if needed.

## 2023-01-20 NOTE — ASSESSMENT & PLAN NOTE
42 year old right handed woman with migraines.  She reports her migraines starting when she was 4 years old.  She stopped having them around her 30s and then started again around 39.  Her headaches start on the back of her head on the right side and move up her head and behind her eyes.  The pain is a constant pain which can throb when it is more severe which she rates as 9/10 on pain scale 1-10 when most severe with associated light and sound sensitivity along with nausea without much vomiting.  They can last up to a day in duration.  She has had up to 4 migraine days per month but has headaches up to 15 days per month that are not as severe.  She tries to lay down in a dark and quite place to help.  She has taken ibuprofen and Tylenol which does not help much if she does not catch the headaches early.  Caffeine tends to help a little when she has her headaches.  She reports having muscle tension in her neck and shoulders. She is currently on Lexapro.  She has also tried Zomig which made her very nauseous and sweaty.  She has noticed triggers including fluorescent lights, heat and cold.  She does not get any auras with her migraines.  There is family history of migraines in her father.  She denies any history of kidney stones.  She is not able to get pregnant as she has had hysterectomy and her  has had a vasectomy.  I spoke with her with regards to treatment of her migraines with combination of preventative and acute medicines.  Decided to start her on topiramate for migraine prevention and naratriptan for acute treatment after discussing potential side effects.  I provided patient education information on migraine triggers and lifestyle modifications.

## 2023-01-20 NOTE — PROGRESS NOTES
Chief Complaint  Migraine (Pt referred today by Amarilis DE LA CRUZ for migraines. She states she has had them she she was a small child. Pt states pain is right side in back of head and moves up to eyes. Pt has senstivity to light and eye pain at times. Pt states she has had 3 bad headache migraines in last month. )    Subjective          Svetlana Colunga presents to [facility] NEUROLOGY for   HISTORY OF PRESENT ILLNESS:    Svetlana Colunga is a 42 year old right handed woman who presents to neurology clinic for initial evaluation and treatment migraines.  She reports her migraines starting when she was 4 years old.  She stopped having them around her 30s and then started again around 39.  Her headaches start on the back of her head on the right side and move up her head and behind her eyes.  The pain is a constant pain which can throb when it is more severe which she rates as 9/10 on pain scale 1-10 when most severe with associated light and sound sensitivity along with nausea without much vomiting.  They can last up to a day in duration.  She has had up to 4 migraine days per month but has headaches up to 15 days per month that are not as severe.  She tries to lay down in a dark and quite place to help.  She has taken ibuprofen and Tylenol which does not help much if she does not catch the headaches early.  Caffeine tends to help a little when she has her headaches.  She reports having muscle tension in her neck and shoulders. She is currently on Lexapro.  She has noticed triggers including fluorescent lights, heat and cold.  She does not get any auras with her migraines.  There is family history of migraines in her father.  She denies any history of kidney stones.  She is not able to get pregnant as she has had hysterectomy and her  has had a vasectomy.      Past Medical History:   Diagnosis Date   • Anxiety    • Environmental allergies         History reviewed. No pertinent family history.  "    Social History     Socioeconomic History   • Marital status:    Tobacco Use   • Smoking status: Former     Packs/day: 0.25     Years: 5.00     Pack years: 1.25     Types: Cigarettes   • Smokeless tobacco: Never   Vaping Use   • Vaping Use: Never used   Substance and Sexual Activity   • Alcohol use: Yes     Comment: socially   • Drug use: Never   • Sexual activity: Defer     Birth control/protection: Tubal ligation        I have reviewed and confirmed the accuracy of the ROS as documented by the MA/LPN/RN Rosales Blake MD    Review of Systems   Constitutional: Positive for fatigue. Negative for activity change and appetite change.   Eyes: Positive for photophobia and pain. Negative for visual disturbance.   Musculoskeletal: Positive for neck pain and neck stiffness. Negative for back pain.   Neurological: Positive for headache. Negative for dizziness, tremors, seizures, syncope, facial asymmetry, speech difficulty, weakness, light-headedness, numbness, memory problem and confusion.   Psychiatric/Behavioral: Positive for decreased concentration, sleep disturbance and stress. Negative for agitation, behavioral problems, dysphoric mood, hallucinations, self-injury, suicidal ideas, negative for hyperactivity and depressed mood. The patient is nervous/anxious.         Objective   Vital Signs:   /76   Pulse 64   Ht 170.2 cm (67\")   Wt 101 kg (223 lb)   SpO2 97%   BMI 34.93 kg/m²       PHYSICAL EXAM:    General   Mental Status - Alert. General Appearance - Well developed, Well groomed, Oriented and Cooperative. Orientation - Oriented X3.       Head and Neck  Head - normocephalic, atraumatic with no lesions or palpable masses.  Neck    Global Assessment - supple.       Eye   Sclera/Conjunctiva - Bilateral - Normal.    ENMT  Mouth and Throat   Oral Cavity/Oropharynx: Oropharynx - the soft palate,uvula and tongue are normal in appearance.    Chest and Lung Exam   Chest - lung clear to auscultation " bilaterally.    Cardiovascular   Cardiovascular examination reveals  - normal heart sounds, regular rate and rhythm.    Neurologic   Mental Status: Speech - Normal. Cognitive function - appropriate fund of knowledge. No impairment of attention, Impairment of concentration, impairment of long term memory or impairment of short term memory.  Cranial Nerves:   II Optic: Visual acuity - Left - Normal. Right - Normal. Visual fields - Normal (to confrontation).  III Oculomotor: Pupillary constriction - Left - Normal. Right - Normal.  VII Facial: - Normal Bilaterally.   IX Glossopharyngeal / X Vagus - Normal.  XI Accessory: Trapezius - Bilateral - Normal. Sternocleidomastoid - Bilateral - Normal.  XII Hypoglossal - Bilateral - Normal.  Eye Movements: - Normal Bilaterally.  Sensory:   Light Touch: Intact - Globally.  Motor:   Bulk and Contour: - Normal.  Tone: - Normal.  Tremor: Not present.  Strength: 5/5 normal muscle strength - All Muscles.   General Assessment of Reflexes: - deep tendon reflexes are normal. Coordination - No Impairment of finger-to-nose or Impairment of rapid alternating movements. Gait - Normal.     Result Review :                 Assessment and Plan    Problem List Items Addressed This Visit        Musculoskeletal and Injuries    Occipital neuralgia of right side    Current Assessment & Plan     She does report headaches starting in the back of her head in the greater and lesser occipital nerve distribution on the right side mainly.  We can consider lesser and greater ONBs on the right side in the future if needed.           Relevant Medications    topiramate (TOPAMAX) 25 MG tablet       Neuro    Migraine without aura and without status migrainosus, not intractable - Primary    Current Assessment & Plan     42 year old right handed woman with migraines.  She reports her migraines starting when she was 4 years old.  She stopped having them around her 30s and then started again around 39.  Her headaches  start on the back of her head on the right side and move up her head and behind her eyes.  The pain is a constant pain which can throb when it is more severe which she rates as 9/10 on pain scale 1-10 when most severe with associated light and sound sensitivity along with nausea without much vomiting.  They can last up to a day in duration.  She has had up to 4 migraine days per month but has headaches up to 15 days per month that are not as severe.  She tries to lay down in a dark and quite place to help.  She has taken ibuprofen and Tylenol which does not help much if she does not catch the headaches early.  Caffeine tends to help a little when she has her headaches.  She reports having muscle tension in her neck and shoulders. She is currently on Lexapro.  She has also tried Zomig which made her very nauseous and sweaty.  She has noticed triggers including fluorescent lights, heat and cold.  She does not get any auras with her migraines.  There is family history of migraines in her father.  She denies any history of kidney stones.  She is not able to get pregnant as she has had hysterectomy and her  has had a vasectomy.  I spoke with her with regards to treatment of her migraines with combination of preventative and acute medicines.  Decided to start her on topiramate for migraine prevention and naratriptan for acute treatment after discussing potential side effects.  I provided patient education information on migraine triggers and lifestyle modifications.                 Relevant Medications    topiramate (TOPAMAX) 25 MG tablet    naratriptan (AMERGE) 1 MG tablet       I spent 45 minutes caring for Svetlana on this date of service. This time includes time spent by me in the following activities:preparing for the visit, reviewing tests, obtaining and/or reviewing a separately obtained history, performing a medically appropriate examination and/or evaluation , counseling and educating the  patient/family/caregiver, ordering medications, tests, or procedures, documenting information in the medical record and care coordination    Follow Up   Return in about 2 months (around 3/20/2023).  Patient was given instructions and counseling regarding her condition or for health maintenance advice. Please see specific information pulled into the AVS if appropriate.

## 2023-01-26 ENCOUNTER — PATIENT ROUNDING (BHMG ONLY) (OUTPATIENT)
Dept: NEUROLOGY | Facility: CLINIC | Age: 43
End: 2023-01-26
Payer: COMMERCIAL

## 2023-03-31 ENCOUNTER — OFFICE VISIT (OUTPATIENT)
Dept: NEUROLOGY | Facility: CLINIC | Age: 43
End: 2023-03-31
Payer: COMMERCIAL

## 2023-03-31 VITALS
HEIGHT: 67 IN | DIASTOLIC BLOOD PRESSURE: 74 MMHG | BODY MASS INDEX: 35.53 KG/M2 | HEART RATE: 74 BPM | SYSTOLIC BLOOD PRESSURE: 124 MMHG | WEIGHT: 226.4 LBS | OXYGEN SATURATION: 98 %

## 2023-03-31 DIAGNOSIS — G43.009 MIGRAINE WITHOUT AURA AND WITHOUT STATUS MIGRAINOSUS, NOT INTRACTABLE: Primary | ICD-10-CM

## 2023-03-31 DIAGNOSIS — M54.81 OCCIPITAL NEURALGIA OF RIGHT SIDE: ICD-10-CM

## 2023-03-31 PROCEDURE — 99213 OFFICE O/P EST LOW 20 MIN: CPT | Performed by: PSYCHIATRY & NEUROLOGY

## 2023-03-31 RX ORDER — TOPIRAMATE 50 MG/1
50 CAPSULE, EXTENDED RELEASE ORAL DAILY
Qty: 30 EACH | Refills: 2 | Status: SHIPPED | OUTPATIENT
Start: 2023-03-31

## 2023-03-31 NOTE — ASSESSMENT & PLAN NOTE
42 year old right handed woman with migraines that have responded well to topiramate.  She reports her migraines starting when she was 4 years old.  She stopped having them around her 30s and then started again around 39.  Her headaches start on the back of her head on the right side and move up her head and behind her eyes.  The pain is a constant pain which can throb when it is more severe which she rates as 9/10 on pain scale 1-10 when most severe with associated light and sound sensitivity along with nausea without much vomiting.  They can last up to a day in duration.  She has had up to 4 migraine days per month but has headaches up to 15 days per month that are not as severe and since starting topiramate she tells me she has not had an actual migraine but has had some breakthrough headaches as she does forget to take the medicine with the second dose.  She tries to lay down in a dark and quite place to help.  She has taken ibuprofen and Tylenol which does not help much if she does not catch the headaches early.  Caffeine tends to help a little when she has her headaches.  She reports having muscle tension in her neck and shoulders. She is currently on Lexapro.  She has noticed triggers including fluorescent lights, heat and cold.  She does not get any auras with her migraines.  There is family history of migraines in her father.  She denies any history of kidney stones.  She is not able to get pregnant as she has had hysterectomy and her  has had a vasectomy.  I will switch her topiramate to extended release formulation for further assistance as she forgets to take the evening dose.

## 2023-03-31 NOTE — PROGRESS NOTES
Chief Complaint  Migraine    Subjective          Svetlana Colunga presents to [facility] NEUROLOGY for   HISTORY OF PRESENT ILLNESS:    Svetlana Colunga is a 42 year old right handed woman who returns to neurology clinic for follow up evaluation and treatment migraines.  She reports her migraines starting when she was 4 years old.  She stopped having them around her 30s and then started again around 39.  Her headaches start on the back of her head on the right side and move up her head and behind her eyes.  The pain is a constant pain which can throb when it is more severe which she rates as 9/10 on pain scale 1-10 when most severe with associated light and sound sensitivity along with nausea without much vomiting.  They can last up to a day in duration.  She has had up to 4 migraine days per month but has headaches up to 15 days per month that are not as severe and since starting topiramate she tells me she has not had an actual migraine but has had some breakthrough headaches as she does forget to take the medicine with the second dose.  She tries to lay down in a dark and quite place to help.  She has taken ibuprofen and Tylenol which does not help much if she does not catch the headaches early.  Caffeine tends to help a little when she has her headaches.  She reports having muscle tension in her neck and shoulders. She is currently on Lexapro.  She has noticed triggers including fluorescent lights, heat and cold.  She does not get any auras with her migraines.  There is family history of migraines in her father.  She denies any history of kidney stones.  She is not able to get pregnant as she has had hysterectomy and her  has had a vasectomy.      Past Medical History:   Diagnosis Date   • Anxiety    • Environmental allergies         History reviewed. No pertinent family history.     Social History     Socioeconomic History   • Marital status:    Tobacco Use   • Smoking status: Former      "Packs/day: 0.25     Years: 5.00     Pack years: 1.25     Types: Cigarettes   • Smokeless tobacco: Never   Vaping Use   • Vaping Use: Never used   Substance and Sexual Activity   • Alcohol use: Yes     Comment: socially   • Drug use: Never   • Sexual activity: Defer     Birth control/protection: Tubal ligation        I have reviewed and confirmed the accuracy of the ROS as documented by the MA/LPN/RN Rosales Blake MD    Review of Systems   Neurological: Positive for headache. Negative for dizziness, tremors, seizures, syncope, facial asymmetry, speech difficulty, weakness, light-headedness, numbness, memory problem and confusion.   Psychiatric/Behavioral: Negative for agitation, behavioral problems, decreased concentration, dysphoric mood, hallucinations, self-injury, sleep disturbance, suicidal ideas, negative for hyperactivity, depressed mood and stress. The patient is not nervous/anxious.         Objective   Vital Signs:   /74   Pulse 74   Ht 170.2 cm (67.01\")   Wt 103 kg (226 lb 6.4 oz)   SpO2 98%   BMI 35.45 kg/m²       PHYSICAL EXAM:    General   Mental Status - Alert. General Appearance - Well developed, Well groomed, Oriented and Cooperative. Orientation - Oriented X3.       Head and Neck  Head - normocephalic, atraumatic with no lesions or palpable masses.  Neck    Global Assessment - supple.       Eye   Sclera/Conjunctiva - Bilateral - Normal.    ENMT  Mouth and Throat   Oral Cavity/Oropharynx: Oropharynx - the soft palate,uvula and tongue are normal in appearance.    Chest and Lung Exam   Chest - lung clear to auscultation bilaterally.    Cardiovascular   Cardiovascular examination reveals  - normal heart sounds, regular rate and rhythm.    Neurologic   Mental Status: Speech - Normal. Cognitive function - appropriate fund of knowledge. No impairment of attention, Impairment of concentration, impairment of long term memory or impairment of short term memory.  Cranial Nerves:   II Optic: Visual " acuity - Left - Normal. Right - Normal. Visual fields - Normal (to confrontation).  III Oculomotor: Pupillary constriction - Left - Normal. Right - Normal.  VII Facial: - Normal Bilaterally.   IX Glossopharyngeal / X Vagus - Normal.  XI Accessory: Trapezius - Bilateral - Normal. Sternocleidomastoid - Bilateral - Normal.  XII Hypoglossal - Bilateral - Normal.  Eye Movements: - Normal Bilaterally.  Sensory:   Light Touch: Intact - Globally.  Motor:   Bulk and Contour: - Normal.  Tone: - Normal.  Tremor: Not present.  Strength: 5/5 normal muscle strength - All Muscles.   General Assessment of Reflexes: - deep tendon reflexes are normal. Coordination - No Impairment of finger-to-nose or Impairment of rapid alternating movements. Gait - Normal.      Result Review :                 Assessment and Plan    Problem List Items Addressed This Visit        Musculoskeletal and Injuries    Occipital neuralgia of right side    Current Assessment & Plan     We can also do ONBs on the right side if needed in the future.          Relevant Medications    Topiramate ER 50 MG capsule extended-release 24 hour sprinkle       Neuro    Migraine without aura and without status migrainosus, not intractable - Primary    Current Assessment & Plan     42 year old right handed woman with migraines that have responded well to topiramate.  She reports her migraines starting when she was 4 years old.  She stopped having them around her 30s and then started again around 39.  Her headaches start on the back of her head on the right side and move up her head and behind her eyes.  The pain is a constant pain which can throb when it is more severe which she rates as 9/10 on pain scale 1-10 when most severe with associated light and sound sensitivity along with nausea without much vomiting.  They can last up to a day in duration.  She has had up to 4 migraine days per month but has headaches up to 15 days per month that are not as severe and since  starting topiramate she tells me she has not had an actual migraine but has had some breakthrough headaches as she does forget to take the medicine with the second dose.  She tries to lay down in a dark and quite place to help.  She has taken ibuprofen and Tylenol which does not help much if she does not catch the headaches early.  Caffeine tends to help a little when she has her headaches.  She reports having muscle tension in her neck and shoulders. She is currently on Lexapro.  She has noticed triggers including fluorescent lights, heat and cold.  She does not get any auras with her migraines.  There is family history of migraines in her father.  She denies any history of kidney stones.  She is not able to get pregnant as she has had hysterectomy and her  has had a vasectomy.  I will switch her topiramate to extended release formulation for further assistance as she forgets to take the evening dose.             Relevant Medications    Topiramate ER 50 MG capsule extended-release 24 hour sprinkle       Follow Up   Return in about 3 months (around 6/30/2023).  Patient was given instructions and counseling regarding her condition or for health maintenance advice. Please see specific information pulled into the AVS if appropriate.

## 2023-06-16 DIAGNOSIS — G43.009 MIGRAINE WITHOUT AURA AND WITHOUT STATUS MIGRAINOSUS, NOT INTRACTABLE: ICD-10-CM

## 2023-06-16 DIAGNOSIS — M54.81 OCCIPITAL NEURALGIA OF RIGHT SIDE: ICD-10-CM

## 2023-06-16 RX ORDER — TOPIRAMATE 50 MG/1
1 CAPSULE, EXTENDED RELEASE ORAL DAILY
Qty: 90 EACH | Refills: 3 | Status: SHIPPED | OUTPATIENT
Start: 2023-06-16

## 2023-12-27 ENCOUNTER — OFFICE VISIT (OUTPATIENT)
Dept: NEUROLOGY | Facility: CLINIC | Age: 43
End: 2023-12-27
Payer: COMMERCIAL

## 2023-12-27 ENCOUNTER — SPECIALTY PHARMACY (OUTPATIENT)
Dept: NEUROLOGY | Facility: CLINIC | Age: 43
End: 2023-12-27
Payer: COMMERCIAL

## 2023-12-27 VITALS
DIASTOLIC BLOOD PRESSURE: 74 MMHG | SYSTOLIC BLOOD PRESSURE: 122 MMHG | OXYGEN SATURATION: 100 % | HEIGHT: 67 IN | BODY MASS INDEX: 33.27 KG/M2 | WEIGHT: 212 LBS | HEART RATE: 65 BPM

## 2023-12-27 DIAGNOSIS — G43.009 MIGRAINE WITHOUT AURA AND WITHOUT STATUS MIGRAINOSUS, NOT INTRACTABLE: Primary | ICD-10-CM

## 2023-12-27 DIAGNOSIS — M54.81 OCCIPITAL NEURALGIA OF RIGHT SIDE: ICD-10-CM

## 2023-12-27 PROCEDURE — 99213 OFFICE O/P EST LOW 20 MIN: CPT | Performed by: PSYCHIATRY & NEUROLOGY

## 2023-12-27 RX ORDER — TOPIRAMATE 50 MG/1
1 CAPSULE, EXTENDED RELEASE ORAL DAILY
Qty: 90 EACH | Refills: 3 | Status: SHIPPED | OUTPATIENT
Start: 2023-12-27

## 2023-12-27 NOTE — LETTER
December 27, 2023       No Recipients    Patient: Svetlana Colunga   YOB: 1980   Date of Visit: 12/27/2023     Dear JONO Leyva:       Thank you for referring Svetlana Colunga to me for evaluation. Below are the relevant portions of my assessment and plan of care.    If you have questions, please do not hesitate to call me. I look forward to following Svetlana along with you.         Sincerely,        Rosales Blake MD        CC:   No Recipients    Rosales Blake MD  12/27/23 1056  Sign when Signing Visit  Chief Complaint  Migraine    Subjective         Svetlana Colunga presents to [facility] NEUROLOGY for   HISTORY OF PRESENT ILLNESS:    Svetlana Colunga is a 43 year old right handed woman who returns to neurology clinic for follow up evaluation and treatment migraines.  She reports her migraines starting when she was 4 years old.  She stopped having them around her 30s and then started again around 39.  Her headaches start on the back of her head on the right side and move up her head and behind her eyes.  The pain is a constant pain which can throb when it is more severe which she rates as 9/10 on pain scale 1-10 when most severe with associated light and sound sensitivity along with nausea without much vomiting.  They can last up to a day in duration.  Since starting topiramate she tells me she has not had an actual migraine and doing well with this medicine.  She tries to lay down in a dark and quite place to help.  She has taken ibuprofen and Tylenol which does not help much if she does not catch the headaches early.  Caffeine tends to help a little when she has her headaches.  She reports having muscle tension in her neck and shoulders. She is currently on Lexapro.  She has noticed triggers including fluorescent lights, heat and cold.  She does not get any auras with her migraines.  There is family history of migraines in her father.  She denies any history of kidney stones.  She is not  "able to get pregnant as she has had her tubes tied and her  has had a vasectomy.     Past Medical History:   Diagnosis Date   • Anxiety    • Environmental allergies         History reviewed. No pertinent family history.     Social History     Socioeconomic History   • Marital status:    Tobacco Use   • Smoking status: Former     Packs/day: 0.25     Years: 5.00     Additional pack years: 0.00     Total pack years: 1.25     Types: Cigarettes   • Smokeless tobacco: Never   Vaping Use   • Vaping Use: Never used   Substance and Sexual Activity   • Alcohol use: Yes     Comment: socially   • Drug use: Never   • Sexual activity: Defer     Birth control/protection: Tubal ligation        I have reviewed and confirmed the accuracy of the ROS as documented by the MA/LPN/RN Rosales Blake MD   Review of Systems   Eyes:  Positive for photophobia and visual disturbance.   Gastrointestinal:  Positive for nausea.   Musculoskeletal:  Positive for neck pain and neck stiffness.   Neurological:  Positive for headache. Negative for dizziness, tremors, seizures, syncope, facial asymmetry, speech difficulty, weakness, light-headedness, numbness, memory problem and confusion.        Objective  Vital Signs:   /74   Pulse 65   Ht 170.2 cm (67.01\")   Wt 96.2 kg (212 lb)   SpO2 100%   BMI 33.19 kg/m²       PHYSICAL EXAM:    General   Mental Status - Alert. General Appearance - Well developed, Well groomed, Oriented and Cooperative. Orientation - Oriented X3.       Head and Neck  Head - normocephalic, atraumatic with no lesions or palpable masses.  Neck    Global Assessment - supple.       Eye   Sclera/Conjunctiva - Bilateral - Normal.    ENMT  Mouth and Throat   Oral Cavity/Oropharynx: Oropharynx - the soft palate,uvula and tongue are normal in appearance.    Chest and Lung Exam   Chest - lung clear to auscultation bilaterally.    Cardiovascular   Cardiovascular examination reveals  - normal heart sounds, regular rate " and rhythm.    Neurologic   Mental Status: Speech - Normal. Cognitive function - appropriate fund of knowledge. No impairment of attention, Impairment of concentration, impairment of long term memory or impairment of short term memory.  Cranial Nerves:   II Optic: Visual acuity - Left - Normal. Right - Normal. Visual fields - Normal (to confrontation).  III Oculomotor: Pupillary constriction - Left - Normal. Right - Normal.  VII Facial: - Normal Bilaterally.   IX Glossopharyngeal / X Vagus - Normal.  XI Accessory: Trapezius - Bilateral - Normal. Sternocleidomastoid - Bilateral - Normal.  XII Hypoglossal - Bilateral - Normal.  Eye Movements: - Normal Bilaterally.  Sensory:   Light Touch: Intact - Globally.  Motor:   Bulk and Contour: - Normal.  Tone: - Normal.  Tremor: Not present.  Strength: 5/5 normal muscle strength - All Muscles.   General Assessment of Reflexes: - deep tendon reflexes are normal. Coordination - No Impairment of finger-to-nose or Impairment of rapid alternating movements. Gait - Normal.       Result Review:                 Assessment and Plan    Problem List Items Addressed This Visit          Musculoskeletal and Injuries    Occipital neuralgia of right side    Relevant Medications    Topiramate ER 50 MG capsule extended-release 24 hour sprinkle       Neuro    Migraine without aura and without status migrainosus, not intractable - Primary    Current Assessment & Plan     43 year old right handed woman with episodic migraines that are well controlled on topiramate.  She reports her migraines starting when she was 4 years old.  She stopped having them around her 30s and then started again around 39.  Her headaches start on the back of her head on the right side and move up her head and behind her eyes.  The pain is a constant pain which can throb when it is more severe which she rates as 9/10 on pain scale 1-10 when most severe with associated light and sound sensitivity along with nausea without  much vomiting.  They can last up to a day in duration.  Since starting topiramate she tells me she has not had an actual migraine and doing well with this medicine.  She tries to lay down in a dark and quite place to help.  She has taken ibuprofen and Tylenol which does not help much if she does not catch the headaches early.  Caffeine tends to help a little when she has her headaches.  She reports having muscle tension in her neck and shoulders. She is currently on Lexapro.  She has noticed triggers including fluorescent lights, heat and cold.  She does not get any auras with her migraines.  There is family history of migraines in her father.  She denies any history of kidney stones.  She is not able to get pregnant as she has had tubes tied and her  has had a vasectomy.  I will continue the topiramate for prevention and naratriptan for acute treatment.  Again advised her not to get pregnant on this medicine.           Relevant Medications    Topiramate ER 50 MG capsule extended-release 24 hour sprinkle       Follow Up   Return in about 1 year (around 12/27/2024).  Patient was given instructions and counseling regarding her condition or for health maintenance advice. Please see specific information pulled into the AVS if appropriate.

## 2023-12-27 NOTE — PROGRESS NOTES
Chief Complaint  Migraine    Subjective          Svetlana Colunga presents to [facility] NEUROLOGY for   HISTORY OF PRESENT ILLNESS:    Svetlana Colunga is a 43 year old right handed woman who returns to neurology clinic for follow up evaluation and treatment migraines.  She reports her migraines starting when she was 4 years old.  She stopped having them around her 30s and then started again around 39.  Her headaches start on the back of her head on the right side and move up her head and behind her eyes.  The pain is a constant pain which can throb when it is more severe which she rates as 9/10 on pain scale 1-10 when most severe with associated light and sound sensitivity along with nausea without much vomiting.  They can last up to a day in duration.  Since starting topiramate she tells me she has not had an actual migraine and doing well with this medicine.  She tries to lay down in a dark and quite place to help.  She has taken ibuprofen and Tylenol which does not help much if she does not catch the headaches early.  Caffeine tends to help a little when she has her headaches.  She reports having muscle tension in her neck and shoulders. She is currently on Lexapro.  She has noticed triggers including fluorescent lights, heat and cold.  She does not get any auras with her migraines.  There is family history of migraines in her father.  She denies any history of kidney stones.  She is not able to get pregnant as she has had her tubes tied and her  has had a vasectomy.     Past Medical History:   Diagnosis Date    Anxiety     Environmental allergies         History reviewed. No pertinent family history.     Social History     Socioeconomic History    Marital status:    Tobacco Use    Smoking status: Former     Packs/day: 0.25     Years: 5.00     Additional pack years: 0.00     Total pack years: 1.25     Types: Cigarettes    Smokeless tobacco: Never   Vaping Use    Vaping Use: Never used  "  Substance and Sexual Activity    Alcohol use: Yes     Comment: socially    Drug use: Never    Sexual activity: Defer     Birth control/protection: Tubal ligation        I have reviewed and confirmed the accuracy of the ROS as documented by the MA/LPN/RN Rosales Blake MD   Review of Systems   Eyes:  Positive for photophobia and visual disturbance.   Gastrointestinal:  Positive for nausea.   Musculoskeletal:  Positive for neck pain and neck stiffness.   Neurological:  Positive for headache. Negative for dizziness, tremors, seizures, syncope, facial asymmetry, speech difficulty, weakness, light-headedness, numbness, memory problem and confusion.        Objective   Vital Signs:   /74   Pulse 65   Ht 170.2 cm (67.01\")   Wt 96.2 kg (212 lb)   SpO2 100%   BMI 33.19 kg/m²       PHYSICAL EXAM:    General   Mental Status - Alert. General Appearance - Well developed, Well groomed, Oriented and Cooperative. Orientation - Oriented X3.       Head and Neck  Head - normocephalic, atraumatic with no lesions or palpable masses.  Neck    Global Assessment - supple.       Eye   Sclera/Conjunctiva - Bilateral - Normal.    ENMT  Mouth and Throat   Oral Cavity/Oropharynx: Oropharynx - the soft palate,uvula and tongue are normal in appearance.    Chest and Lung Exam   Chest - lung clear to auscultation bilaterally.    Cardiovascular   Cardiovascular examination reveals  - normal heart sounds, regular rate and rhythm.    Neurologic   Mental Status: Speech - Normal. Cognitive function - appropriate fund of knowledge. No impairment of attention, Impairment of concentration, impairment of long term memory or impairment of short term memory.  Cranial Nerves:   II Optic: Visual acuity - Left - Normal. Right - Normal. Visual fields - Normal (to confrontation).  III Oculomotor: Pupillary constriction - Left - Normal. Right - Normal.  VII Facial: - Normal Bilaterally.   IX Glossopharyngeal / X Vagus - Normal.  XI Accessory: Trapezius " - Bilateral - Normal. Sternocleidomastoid - Bilateral - Normal.  XII Hypoglossal - Bilateral - Normal.  Eye Movements: - Normal Bilaterally.  Sensory:   Light Touch: Intact - Globally.  Motor:   Bulk and Contour: - Normal.  Tone: - Normal.  Tremor: Not present.  Strength: 5/5 normal muscle strength - All Muscles.   General Assessment of Reflexes: - deep tendon reflexes are normal. Coordination - No Impairment of finger-to-nose or Impairment of rapid alternating movements. Gait - Normal.       Result Review :                 Assessment and Plan    Problem List Items Addressed This Visit          Musculoskeletal and Injuries    Occipital neuralgia of right side    Relevant Medications    Topiramate ER 50 MG capsule extended-release 24 hour sprinkle       Neuro    Migraine without aura and without status migrainosus, not intractable - Primary    Current Assessment & Plan     43 year old right handed woman with episodic migraines that are well controlled on topiramate.  She reports her migraines starting when she was 4 years old.  She stopped having them around her 30s and then started again around 39.  Her headaches start on the back of her head on the right side and move up her head and behind her eyes.  The pain is a constant pain which can throb when it is more severe which she rates as 9/10 on pain scale 1-10 when most severe with associated light and sound sensitivity along with nausea without much vomiting.  They can last up to a day in duration.  Since starting topiramate she tells me she has not had an actual migraine and doing well with this medicine.  She tries to lay down in a dark and quite place to help.  She has taken ibuprofen and Tylenol which does not help much if she does not catch the headaches early.  Caffeine tends to help a little when she has her headaches.  She reports having muscle tension in her neck and shoulders. She is currently on Lexapro.  She has noticed triggers including fluorescent  lights, heat and cold.  She does not get any auras with her migraines.  There is family history of migraines in her father.  She denies any history of kidney stones.  She is not able to get pregnant as she has had tubes tied and her  has had a vasectomy.  I will continue the topiramate for prevention and naratriptan for acute treatment.  Again advised her not to get pregnant on this medicine.           Relevant Medications    Topiramate ER 50 MG capsule extended-release 24 hour sprinkle       Follow Up   Return in about 1 year (around 12/27/2024).  Patient was given instructions and counseling regarding her condition or for health maintenance advice. Please see specific information pulled into the AVS if appropriate.

## 2023-12-27 NOTE — ASSESSMENT & PLAN NOTE
43 year old right handed woman with episodic migraines that are well controlled on topiramate.  She reports her migraines starting when she was 4 years old.  She stopped having them around her 30s and then started again around 39.  Her headaches start on the back of her head on the right side and move up her head and behind her eyes.  The pain is a constant pain which can throb when it is more severe which she rates as 9/10 on pain scale 1-10 when most severe with associated light and sound sensitivity along with nausea without much vomiting.  They can last up to a day in duration.  Since starting topiramate she tells me she has not had an actual migraine and doing well with this medicine.  She tries to lay down in a dark and quite place to help.  She has taken ibuprofen and Tylenol which does not help much if she does not catch the headaches early.  Caffeine tends to help a little when she has her headaches.  She reports having muscle tension in her neck and shoulders. She is currently on Lexapro.  She has noticed triggers including fluorescent lights, heat and cold.  She does not get any auras with her migraines.  There is family history of migraines in her father.  She denies any history of kidney stones.  She is not able to get pregnant as she has had tubes tied and her  has had a vasectomy.  I will continue the topiramate for prevention and naratriptan for acute treatment.  Again advised her not to get pregnant on this medicine.

## 2024-12-27 ENCOUNTER — OFFICE VISIT (OUTPATIENT)
Dept: NEUROLOGY | Facility: CLINIC | Age: 44
End: 2024-12-27
Payer: COMMERCIAL

## 2024-12-27 VITALS
DIASTOLIC BLOOD PRESSURE: 76 MMHG | SYSTOLIC BLOOD PRESSURE: 118 MMHG | HEIGHT: 67 IN | HEART RATE: 71 BPM | OXYGEN SATURATION: 97 % | BODY MASS INDEX: 33.2 KG/M2

## 2024-12-27 DIAGNOSIS — G43.009 MIGRAINE WITHOUT AURA AND WITHOUT STATUS MIGRAINOSUS, NOT INTRACTABLE: Primary | ICD-10-CM

## 2024-12-27 DIAGNOSIS — M54.81 OCCIPITAL NEURALGIA OF RIGHT SIDE: ICD-10-CM

## 2024-12-27 PROCEDURE — 99213 OFFICE O/P EST LOW 20 MIN: CPT | Performed by: PSYCHIATRY & NEUROLOGY

## 2024-12-27 RX ORDER — TOPIRAMATE 50 MG/1
1 CAPSULE, EXTENDED RELEASE ORAL DAILY
Qty: 90 EACH | Refills: 3 | Status: SHIPPED | OUTPATIENT
Start: 2024-12-27

## 2024-12-27 NOTE — PROGRESS NOTES
Chief Complaint  Migraine (No migraines)    Subjective          Svetlana Colunga presents to [facility] NEUROLOGY for   HISTORY OF PRESENT ILLNESS:    Svetlana Colunga is a 44 year old right handed woman who returns to neurology clinic for follow up evaluation and treatment of migraines.  She reports her migraines starting when she was 4 years old.  She stopped having them around her 30s and then started again around 39.  Her headaches start on the back of her head on the right side and move up her head and behind her eyes.  The pain is a constant pain which can throb when it is more severe which she rates as 9/10 on pain scale 1-10 when most severe with associated light and sound sensitivity along with nausea without much vomiting.  They can last up to a day in duration.  Since starting topiramate she tells me she has not had an actual migraine and doing well with this medicine.  She tries to lay down in a dark and quite place to help.  She has taken ibuprofen and Tylenol which does not help much if she does not catch the headaches early.  Caffeine tends to help a little when she has her headaches.  She reports having muscle tension in her neck and shoulders. She is currently on Lexapro.  She has noticed triggers including fluorescent lights, heat and cold.  She does not get any auras with her migraines.  There is family history of migraines in her father.  She denies any history of kidney stones.  She is not able to get pregnant as she has had her tubes tied and her  has had a vasectomy.     Past Medical History:   Diagnosis Date    Anxiety     CTS (carpal tunnel syndrome) 3/2011    Environmental allergies     Headache, tension-type 1990    Migraine 1/1986        Family History   Problem Relation Age of Onset    Migraines Father     Stroke Paternal Grandmother         Social History     Socioeconomic History    Marital status:    Tobacco Use    Smoking status: Former     Current packs/day:  "0.25     Average packs/day: 0.3 packs/day for 5.2 years (1.3 ttl pk-yrs)     Types: Cigarettes    Smokeless tobacco: Never   Vaping Use    Vaping status: Never Used   Substance and Sexual Activity    Alcohol use: Yes     Comment: Hardly ever    Drug use: Never    Sexual activity: Yes     Partners: Male     Birth control/protection: Tubal ligation, Vasectomy        I have reviewed and confirmed the accuracy of the ROS as documented by the MA/LPN/RN Rosales Blake MD   Review of Systems   Neurological:  Positive for headache. Negative for dizziness, tremors, seizures, syncope, facial asymmetry, speech difficulty, weakness, light-headedness, numbness, memory problem and confusion.   Psychiatric/Behavioral:  Negative for agitation, behavioral problems, decreased concentration, dysphoric mood, hallucinations, self-injury, sleep disturbance, suicidal ideas, negative for hyperactivity, depressed mood and stress. The patient is not nervous/anxious.         Objective   Vital Signs:   /76   Pulse 71   Ht 170.2 cm (67.01\")   SpO2 97%   BMI 33.20 kg/m²       PHYSICAL EXAM:    General   Mental Status - Alert. General Appearance - Well developed, Well groomed, Oriented and Cooperative. Orientation - Oriented X3.       Head and Neck  Head - normocephalic, atraumatic with no lesions or palpable masses.  Neck    Global Assessment - supple.       Eye   Sclera/Conjunctiva - Bilateral - Normal.    ENMT  Mouth and Throat   Oral Cavity/Oropharynx: Oropharynx - the soft palate,uvula and tongue are normal in appearance.    Chest and Lung Exam   Chest - lung clear to auscultation bilaterally.    Cardiovascular   Cardiovascular examination reveals  - normal heart sounds, regular rate and rhythm.    Neurologic   Mental Status: Speech - Normal. Cognitive function - appropriate fund of knowledge. No impairment of attention, Impairment of concentration, impairment of long term memory or impairment of short term memory.  Cranial " Nerves:   II Optic: Visual acuity - Left - Normal. Right - Normal. Visual fields - Normal (to confrontation).  III Oculomotor: Pupillary constriction - Left - Normal. Right - Normal.  VII Facial: - Normal Bilaterally.   IX Glossopharyngeal / X Vagus - Normal.  XI Accessory: Trapezius - Bilateral - Normal. Sternocleidomastoid - Bilateral - Normal.  XII Hypoglossal - Bilateral - Normal.  Eye Movements: - Normal Bilaterally.  Sensory:   Light Touch: Intact - Globally.  Motor:   Bulk and Contour: - Normal.  Tone: - Normal.  Tremor: Not present.  Strength: 5/5 normal muscle strength - All Muscles.   General Assessment of Reflexes: - deep tendon reflexes are normal. Coordination - No Impairment of finger-to-nose or Impairment of rapid alternating movements. Gait - Normal.       Result Review :                 Assessment and Plan    Problem List Items Addressed This Visit       Migraine without aura and without status migrainosus, not intractable - Primary    Current Assessment & Plan     44 year old right handed woman with migraines.  She reports her migraines starting when she was 4 years old.  She stopped having them around her 30s and then started again around 39.  Her headaches start on the back of her head on the right side and move up her head and behind her eyes.  The pain is a constant pain which can throb when it is more severe which she rates as 9/10 on pain scale 1-10 when most severe with associated light and sound sensitivity along with nausea without much vomiting.  They can last up to a day in duration.  Since starting topiramate she tells me she has not had an actual migraine and doing well with this medicine.  She tries to lay down in a dark and quite place to help.  She has taken ibuprofen and Tylenol which does not help much if she does not catch the headaches early.  Caffeine tends to help a little when she has her headaches.  She reports having muscle tension in her neck and shoulders. She is  currently on Lexapro.  She has noticed triggers including fluorescent lights, heat and cold.  She does not get any auras with her migraines.  There is family history of migraines in her father.  She denies any history of kidney stones.  She is not able to get pregnant as she has had her tubes tied and her  has had a vasectomy.  I will continue current combination of topiramate for prevention and naratriptan for acute treatment which is working well for patient.  Discussed migraine triggers and lifestyle modifications.             Relevant Medications    Topiramate ER 50 MG capsule extended-release 24 hour sprinkle    Occipital neuralgia of right side    Relevant Medications    Topiramate ER 50 MG capsule extended-release 24 hour sprinkle       Follow Up   Return in about 1 year (around 12/27/2025).  Patient was given instructions and counseling regarding her condition or for health maintenance advice. Please see specific information pulled into the AVS if appropriate.

## 2024-12-27 NOTE — ASSESSMENT & PLAN NOTE
44 year old right handed woman with migraines.  She reports her migraines starting when she was 4 years old.  She stopped having them around her 30s and then started again around 39.  Her headaches start on the back of her head on the right side and move up her head and behind her eyes.  The pain is a constant pain which can throb when it is more severe which she rates as 9/10 on pain scale 1-10 when most severe with associated light and sound sensitivity along with nausea without much vomiting.  They can last up to a day in duration.  Since starting topiramate she tells me she has not had an actual migraine and doing well with this medicine.  She tries to lay down in a dark and quite place to help.  She has taken ibuprofen and Tylenol which does not help much if she does not catch the headaches early.  Caffeine tends to help a little when she has her headaches.  She reports having muscle tension in her neck and shoulders. She is currently on Lexapro.  She has noticed triggers including fluorescent lights, heat and cold.  She does not get any auras with her migraines.  There is family history of migraines in her father.  She denies any history of kidney stones.  She is not able to get pregnant as she has had her tubes tied and her  has had a vasectomy.  I will continue current combination of topiramate for prevention and naratriptan for acute treatment which is working well for patient.  Discussed migraine triggers and lifestyle modifications.

## (undated) DEVICE — SPNG LAP 18X18IN LF STRL PK/5

## (undated) DEVICE — IRRIGATOR BULB ASEPTO 60CC STRL

## (undated) DEVICE — JACKSON-PRATT 100CC BULB RESERVOIR: Brand: CARDINAL HEALTH

## (undated) DEVICE — NEEDLE, QUINCKE 22GX3.5": Brand: MEDLINE INDUSTRIES, INC.

## (undated) DEVICE — BANDAGE,GAUZE,BULKEE II,4.5"X4.1YD,STRL: Brand: MEDLINE

## (undated) DEVICE — PK CHST BRST 40

## (undated) DEVICE — SPK PIN NSR FLUID NONVNT 2WY MINI LL LF

## (undated) DEVICE — BIOPATCH™ ANTIMICROBIAL DRESSING WITH CHLORHEXIDINE GLUCONATE IS A HYDROPHILLIC POLYURETHANE ABSORPTIVE FOAM WITH CHLORHEXIDINE GLUCONATE (CHG) WHICH INHIBITS BACTERIAL GROWTH UNDER THE DRESSING. THE DRESSING IS INTENDED TO BE USED TO ABSORB EXUDATE, COVER A WOUND CAUSED BY VASCULAR AND NONVASCULAR PERCUTANEOUS MEDICAL DEVICES DURING SURGERY, AS WELL AS REDUCE LOCAL INFECTION AND COLONIZATION OF MICROORGANISMS.: Brand: BIOPATCH

## (undated) DEVICE — Device

## (undated) DEVICE — SUT MNCRYL 4/0 SH 27IN Y415H

## (undated) DEVICE — GLV SURG BIOGEL LTX PF 7 1/2

## (undated) DEVICE — PROXIMATE RH ROTATING HEAD SKIN STAPLERS (35 WIDE) CONTAINS 35 STAINLESS STEEL STAPLES: Brand: PROXIMATE

## (undated) DEVICE — ADHS SKIN SURG TISS VISC PREMIERPRO EXOFIN HI/VISC FAST/DRY

## (undated) DEVICE — SUT MNCRYL 3/0 PS2 18IN MCP497G

## (undated) DEVICE — ELECTRD BLD EZ CLN STD 6.5IN

## (undated) DEVICE — NDL HYPO PRECISIONGLIDE REG 25G 1 1/2